# Patient Record
Sex: FEMALE | Race: WHITE | NOT HISPANIC OR LATINO | URBAN - METROPOLITAN AREA
[De-identification: names, ages, dates, MRNs, and addresses within clinical notes are randomized per-mention and may not be internally consistent; named-entity substitution may affect disease eponyms.]

---

## 2022-01-26 ENCOUNTER — TELEHEALTH PROVIDED OTHER THAN IN PATIENT'S HOME (OUTPATIENT)
Dept: URBAN - METROPOLITAN AREA TELEHEALTH 12 | Facility: TELEHEALTH | Age: 37
End: 2022-01-26

## 2022-01-26 VITALS — WEIGHT: 185 LBS | HEIGHT: 65 IN

## 2022-01-26 DIAGNOSIS — Z86.010 PERSONAL HISTORY OF COLONIC POLYPS: ICD-10-CM

## 2022-01-26 DIAGNOSIS — K59.09 OTHER CONSTIPATION: ICD-10-CM

## 2022-01-26 PROCEDURE — 99204 OFFICE O/P NEW MOD 45 MIN: CPT | Mod: 95 | Performed by: PHYSICIAN ASSISTANT

## 2022-01-26 NOTE — SERVICEHPINOTES
PATIENT VERIFIED BY DATE OF BIRTH AND NAME. Patient has been consented for this telecommunication visit.   She had a colonoscopy in 2012 for many different medical complaints at that time. She states that polyps were removed and she was "supposed to return sooner than now" for a colonoscopy (she believes she was given a 5 yr recall). She eats healthy and takes fiber supplementation. She will get fecal impactions 1-2 times per year. She has a BM 1-3 per day but she feels like they aren't complete. No blood in the stools or per rectum. No abdominal pain, weight loss. No family hx of CRC or polyps. No other GI related complaints today.

## 2022-02-21 ENCOUNTER — OFFICE (OUTPATIENT)
Dept: URBAN - METROPOLITAN AREA CLINIC 34 | Facility: CLINIC | Age: 37
End: 2022-02-21

## 2022-02-21 PROCEDURE — 00031: CPT | Performed by: INTERNAL MEDICINE

## 2022-03-09 ENCOUNTER — OFFICE (OUTPATIENT)
Dept: URBAN - METROPOLITAN AREA CLINIC 30 | Facility: CLINIC | Age: 37
End: 2022-03-09

## 2022-03-09 ENCOUNTER — OFFICE (OUTPATIENT)
Dept: URBAN - METROPOLITAN AREA PATHOLOGY 18 | Facility: PATHOLOGY | Age: 37
End: 2022-03-09

## 2022-03-09 VITALS
SYSTOLIC BLOOD PRESSURE: 105 MMHG | HEART RATE: 76 BPM | DIASTOLIC BLOOD PRESSURE: 69 MMHG | DIASTOLIC BLOOD PRESSURE: 43 MMHG | HEIGHT: 65 IN | DIASTOLIC BLOOD PRESSURE: 75 MMHG | OXYGEN SATURATION: 98 % | OXYGEN SATURATION: 100 % | TEMPERATURE: 98.1 F | WEIGHT: 185 LBS | DIASTOLIC BLOOD PRESSURE: 62 MMHG | DIASTOLIC BLOOD PRESSURE: 68 MMHG | RESPIRATION RATE: 19 BRPM | HEART RATE: 80 BPM | SYSTOLIC BLOOD PRESSURE: 73 MMHG | SYSTOLIC BLOOD PRESSURE: 128 MMHG | RESPIRATION RATE: 16 BRPM | SYSTOLIC BLOOD PRESSURE: 94 MMHG | TEMPERATURE: 97.3 F | HEART RATE: 79 BPM | SYSTOLIC BLOOD PRESSURE: 98 MMHG | OXYGEN SATURATION: 99 % | HEART RATE: 66 BPM | RESPIRATION RATE: 21 BRPM | SYSTOLIC BLOOD PRESSURE: 119 MMHG | HEART RATE: 91 BPM | RESPIRATION RATE: 18 BRPM

## 2022-03-09 DIAGNOSIS — K62.1 RECTAL POLYP: ICD-10-CM

## 2022-03-09 DIAGNOSIS — Z86.010 PERSONAL HISTORY OF COLONIC POLYPS: ICD-10-CM

## 2022-03-09 PROBLEM — K63.5 POLYP OF COLON: Status: ACTIVE | Noted: 2022-03-09

## 2022-03-09 PROCEDURE — 88305 TISSUE EXAM BY PATHOLOGIST: CPT | Performed by: PATHOLOGY

## 2023-05-16 ENCOUNTER — TELEHEALTH PROVIDED IN PATIENT'S HOME (OUTPATIENT)
Dept: URBAN - METROPOLITAN AREA TELEHEALTH 12 | Facility: TELEHEALTH | Age: 38
End: 2023-05-16

## 2023-05-16 VITALS — HEIGHT: 65 IN | WEIGHT: 171 LBS

## 2023-05-16 DIAGNOSIS — K59.09 OTHER CONSTIPATION: ICD-10-CM

## 2023-05-16 DIAGNOSIS — M54.50 LOW BACK PAIN, UNSPECIFIED: ICD-10-CM

## 2023-05-16 PROCEDURE — 99214 OFFICE O/P EST MOD 30 MIN: CPT | Mod: 95 | Performed by: PHYSICIAN ASSISTANT

## 2023-05-16 NOTE — SERVICEHPINOTES
PATIENT VERIFIED BY DATE OF BIRTH AND NAME. Patient has been consented for this telecommunication visit.   Pt is here to discuss lower back pain. The lower back pain began 11/22. She wakes up in the am with lower back pain. It takes a few hours to produce a BM. She has tried enemas which haven't helped. She takes one dose of daily Miralax per day and feels incomplete defecation--has about 2 BMs per day of a type 4 on the BSS. Has lost 24 lbs since 12/22 but intentionally and on phentermine. She tells me that her thyroid and hormone levels have been checked and are normal. She notes that constipation began after the birth of her child 8 yrs ago. She had two vaginal births in total--she had a long labor for 43 hrs. No hx of manual manipulation but has to get into certain angles to be able to defecate. Has to bear down harder than what she thinks is normal. Drinks a lot of water. She tried different doses of linzess--gave her significant diarrhea and it didn't help with the pain. Ros as per HPI and o/w is unremarkable. No other GI related complaints today.

## 2023-07-26 PROBLEM — K62.1 RECTAL POLYP: Status: ACTIVE | Noted: 2022-03-09

## 2025-02-04 ENCOUNTER — OFFICE (OUTPATIENT)
Dept: URBAN - METROPOLITAN AREA CLINIC 34 | Facility: CLINIC | Age: 40
End: 2025-02-04
Payer: COMMERCIAL

## 2025-02-04 VITALS
TEMPERATURE: 97.8 F | HEART RATE: 102 BPM | SYSTOLIC BLOOD PRESSURE: 121 MMHG | HEIGHT: 65 IN | WEIGHT: 183 LBS | DIASTOLIC BLOOD PRESSURE: 93 MMHG

## 2025-02-04 DIAGNOSIS — E66.3 OVERWEIGHT: ICD-10-CM

## 2025-02-04 DIAGNOSIS — K60.2 ANAL FISSURE, UNSPECIFIED: ICD-10-CM

## 2025-02-04 DIAGNOSIS — M54.50 LOW BACK PAIN, UNSPECIFIED: ICD-10-CM

## 2025-02-04 DIAGNOSIS — K59.09 OTHER CONSTIPATION: ICD-10-CM

## 2025-02-04 DIAGNOSIS — N81.6 RECTOCELE: ICD-10-CM

## 2025-02-04 PROCEDURE — 99215 OFFICE O/P EST HI 40 MIN: CPT | Performed by: INTERNAL MEDICINE

## 2025-02-04 RX ORDER — PLECANATIDE 3 MG/1
3 TABLET ORAL
Qty: 30 | Refills: 3 | Status: ACTIVE
Start: 2025-02-04

## 2025-02-04 NOTE — SERVICENOTES
I spent 40 minutes today reviewing the chart, talking with the patient, reviewing previous results with patient, discussing plan, and documenting. Patient understands and agrees with plan. Questions/concerns addressed.

## 2025-02-04 NOTE — SERVICEHPINOTES
KERRI YOUNG   is a   39  female who complains of continued issues with chronic constipation and low back pain. She was last seen in May 2023 with similar complaints. Dynamic pelvic MRI at the time revealed uterine prolapse, cystocele, rectocele. She was referred to urogynecology who felt that surgery may be more of a hindrance (as per patient). She did not see colorectal surgery. She did see physical therapy for pelvic floor but did not feel like it helped the constipation/back pain. She states she continues to wake up in the middle of the night/early morning due to the back pain. She has to stretch or shift positions to get relief. The back pain only relieves after passing flatus or having a BM. If she is able to have a complete BM she will note improvement in back pain for the rest of the day before symptoms return overnight. She has tried miralax, linzess, and amitiza without relief of back pain. Linzess and amitiza caused diarrhea. She eats a good amount of fiber and has tried fiber supplements while increasing fluid intake but still notes symptoms. She can note acute worsening of constipation when stressed. She has tried gas-x at bedtime without any relief overnight. She can note BRBPR which she attributes to history of hemorrhoids and fissures. Currently she is not having any issues with fissures. She denies any black stools. No abdominal pain, nausea, vomiting. She does take a probiotic. She is also on sertraline for anxiety/depression. She is currently on phentermine for weight loss but takes it for 6 months before she stops for about a year. She has not noticed any improvement when stopping phentermine. She is currently using stool softeners. Exercise and yoga do not relieve symptoms. Colonoscopy did not reveal any underlying cause. She has had a hysterectomy for these symptoms and not seen any improvement. No history of endometriosis. Spinal imaging has not revealed any underlying cause for back pain.  
br
br From prior visit:  Pt is here to discuss lower back pain. The lower back pain began 11/22. She wakes up in the am with lower back pain. It takes a few hours to produce a BM. She has tried enemas which haven't helped. She takes one dose of daily Miralax per day and feels incomplete defecation--has about 2 BMs per day of a type 4 on the BSS. Has lost 24 lbs since 12/22 but intentionally and on phentermine. She tells me that her thyroid and hormone levels have been checked and are normal. She notes that constipation began after the birth of her child 8 yrs ago. She had two vaginal births in total--she had a long labor for 43 hrs. No hx of manual manipulation but has to get into certain angles to be able to defecate. Has to bear down harder than what she thinks is normal. Drinks a lot of water. She tried different doses of linzess--gave her significant diarrhea and it didn't help with the pain. Ros as per HPI and o/w is unremarkable. No other GI related complaints today.  
No